# Patient Record
Sex: FEMALE | Race: BLACK OR AFRICAN AMERICAN | NOT HISPANIC OR LATINO | Employment: PART TIME | URBAN - METROPOLITAN AREA
[De-identification: names, ages, dates, MRNs, and addresses within clinical notes are randomized per-mention and may not be internally consistent; named-entity substitution may affect disease eponyms.]

---

## 2018-11-15 ENCOUNTER — OFFICE VISIT (OUTPATIENT)
Dept: FAMILY MEDICINE | Facility: CLINIC | Age: 33
End: 2018-11-15
Payer: COMMERCIAL

## 2018-11-15 VITALS — TEMPERATURE: 98.7 F | DIASTOLIC BLOOD PRESSURE: 75 MMHG | SYSTOLIC BLOOD PRESSURE: 110 MMHG

## 2018-11-15 DIAGNOSIS — Z71.84 TRAVEL ADVICE ENCOUNTER: Primary | ICD-10-CM

## 2018-11-15 PROCEDURE — 99401 PREV MED CNSL INDIV APPRX 15: CPT | Mod: GA | Performed by: FAMILY MEDICINE

## 2018-11-15 RX ORDER — AZITHROMYCIN 500 MG/1
TABLET, FILM COATED ORAL
Qty: 3 TABLET | Refills: 0 | Status: SHIPPED | OUTPATIENT
Start: 2018-11-15 | End: 2024-06-14

## 2018-11-15 RX ORDER — DOXYCYCLINE 100 MG/1
100 CAPSULE ORAL DAILY
Qty: 56 CAPSULE | Refills: 0 | Status: SHIPPED | OUTPATIENT
Start: 2018-11-15 | End: 2024-06-14

## 2018-11-15 NOTE — PROGRESS NOTES
Itinerary:  [facility]     Departure Date: 11/16/18    Return Date: 12/14/18    Length of Trip: 28 days     Purpose of Trip: work     Urban/Rural: urban     Accommodations: house    IMMUNIZATION HISTORY  Have you received any immunizations within the past 4 weeks?  No  Have you ever fainted from having your blood drawn or from an injection?  No  Have you ever had a fever reaction to vaccination?  No  Have you ever had any bad reaction or side effect from any vaccination?  No  Have you ever had hepatitis A or B vaccine?  Yes  Do you live (or work closely) with anyone who has AIDS, an AIDS-like condition, any other immune disorder or who is on chemotherapy for cancer or a   family history of immunodeficiency?  No  Have you received any injection of immune globulin or any blood products during the past 12 months?  No    Patient roomed by JUNIOR Casanova     Special medical concerns: none    /75  Temp 98.7  F (37.1  C) (Oral)  LMP 10/15/2018 (Approximate)  Breastfeeding? No  EXAM: deferred    Immunizations discussed include:   Immunization History   Administered Date(s) Administered     HepB, Unspecified 06/10/1994, 12/20/1994     Hepatitis A Not Indicated - By Hx 12/15/2010     Historical DTP/aP 1986, 01/07/1987, 03/04/1987, 04/06/1988, 08/22/1991     Influenza Vaccine IM 3yrs+ 4 Valent IIV4 11/01/2016, 10/29/2018     MMR 01/06/1988, 06/10/1999     OPV, trivalent, live 1986, 01/07/1987, 04/06/1988, 08/22/1991     Rabies, Unspecified 08/18/2014, 08/25/2014, 09/08/2014     TD (ADULT, 7+) 06/18/2012     Typhoid IM 06/18/2012     Yellow Fever 08/06/2014       Malaraia prophylaxis recommended: pt requests doxycycline  Symptomatic treatment for traveler's diarrhea: azithromycin    ASSESSMENT/PLAN:    ICD-10-CM    1. Travel advice encounter Z71.89 doxycycline (VIBRAMYCIN) 100 MG capsule     azithromycin (ZITHROMAX) 500 MG tablet     I have reviewed general recommendations for safe travel   including:  food/water precautions, insect avoidance, safe sex   practices given high prevalence of HIV and other STDs,   roadway safety. Educational materials and Travax report provided.    Total visit time 15 minutes with over 50% of time spent counseling patient.

## 2018-11-15 NOTE — MR AVS SNAPSHOT
"              After Visit Summary   11/15/2018    Kalen Luis     MRN: [id number]           Patient Information     Date Of Birth          1985        Visit Information        Provider Department      11/15/2018 4:15 PM Tristan Suárez MD [facility]        Today's Diagnoses     Travel advice encounter    -  1       Follow-ups after your visit        Who to contact     If you have questions or need follow up information about today's clinic visit or your schedule please contact [facility] directly at [phone].  Normal or non-critical lab and imaging results will be communicated to you by Callvinehart, letter or phone within 4 business days after the clinic has received the results. If you do not hear from us within 7 days, please contact the clinic through Callvinehart or phone. If you have a critical or abnormal lab result, we will notify you by phone as soon as possible.  Submit refill requests through GaBoom or call your pharmacy and they will forward the refill request to us. Please allow 3 business days for your refill to be completed.          Additional Information About Your Visit        MyChart Information     GaBoom lets you send messages to your doctor, view your test results, renew your prescriptions, schedule appointments and more. To sign up, go to [email or web address] . Click on \"Log in\" on the left side of the screen, which will take you to the Welcome page. Then click on \"Sign up Now\" on the right side of the page.     You will be asked to enter the access code listed below, as well as some personal information. Please follow the directions to create your username and password.     Your access code is: [email or web address]  Expires: 2019 10:40 PM     Your access code will  in 90 days. If you need help or a new code, please call your [facility] or [phone].        Care EveryWhere ID     This is your Care EveryWhere ID. This could be used by other " organizations to access your [place] medical records  [id number]        Your Vitals Were     Temperature Last Period Breastfeeding?             98.7  F (37.1  C) (Oral) 10/15/2018 (Approximate) No          Blood Pressure from Last 3 Encounters:   11/15/18 110/75    Weight from Last 3 Encounters:   No data found for Wt              Today, you had the following     No orders found for display         Today's Medication Changes          These changes are accurate as of 11/15/18 11:59 PM.  If you have any questions, ask your nurse or doctor.               Start taking these medicines.        Dose/Directions    azithromycin 500 MG tablet   Commonly known as:  ZITHROMAX   Used for:  Travel advice encounter        Take one tablet daily for up to 3 days as needed for traveler's diarrhea   Quantity:  3 tablet   Refills:  0       doxycycline 100 MG capsule   Commonly known as:  VIBRAMYCIN   Used for:  Travel advice encounter        Dose:  100 mg   Take 1 capsule (100 mg) by mouth daily For malaria prevention   Quantity:  56 capsule   Refills:  0            Where to get your medicines      Some of these will need a paper prescription and others can be bought over the counter.  Ask your nurse if you have questions.     Bring a paper prescription for each of these medications     azithromycin 500 MG tablet    doxycycline 100 MG capsule                Primary Care Provider Fax #    Physician No Ref-Primary [phone]       No address on file        Equal Access to Services     ROB FREGOSO : Gwendolyn Eldridge, nick inman, sola kaallubna storey, erick garcia. So [facility] [phone].    ATENCIÓN: Si habla español, tiene a ugalde disposición servicios gratuitos de asistencia lingüística. Llame al [phone].    We comply with applicable federal civil rights laws and [place] laws. We do not discriminate on the basis of race, color, national origin, age, disability, sex, sexual  orientation, or gender identity.            Thank you!     Thank you for choosing [facility]  for your care. Our goal is always to provide you with excellent care. Hearing back from our patients is one way we can continue to improve our services. Please take a few minutes to complete the written survey that you may receive in the mail after your visit with us. Thank you!             Your Updated Medication List - Protect others around you: Learn how to safely use, store and throw away your medicines at www.disposemymeds.org.          This list is accurate as of 11/15/18 11:59 PM.  Always use your most recent med list.                   Brand Name Dispense Instructions for use Diagnosis    azithromycin 500 MG tablet    ZITHROMAX    3 tablet    Take one tablet daily for up to 3 days as needed for traveler's diarrhea    Travel advice encounter       doxycycline 100 MG capsule    VIBRAMYCIN    56 capsule    Take 1 capsule (100 mg) by mouth daily For malaria prevention    Travel advice encounter

## 2023-08-09 ENCOUNTER — HOSPITAL ENCOUNTER (EMERGENCY)
Facility: CLINIC | Age: 38
Discharge: HOME | End: 2023-08-09
Attending: EMERGENCY MEDICINE | Admitting: EMERGENCY MEDICINE
Payer: COMMERCIAL

## 2023-08-09 ENCOUNTER — APPOINTMENT (OUTPATIENT)
Dept: CT IMAGING | Facility: CLINIC | Age: 38
End: 2023-08-09
Attending: EMERGENCY MEDICINE
Payer: COMMERCIAL

## 2023-08-09 VITALS
DIASTOLIC BLOOD PRESSURE: 83 MMHG | TEMPERATURE: 98.3 F | OXYGEN SATURATION: 99 % | RESPIRATION RATE: 18 BRPM | SYSTOLIC BLOOD PRESSURE: 126 MMHG | HEART RATE: 81 BPM

## 2023-08-09 DIAGNOSIS — R13.10 DYSPHAGIA, UNSPECIFIED TYPE: ICD-10-CM

## 2023-08-09 LAB
ALBUMIN SERPL BCG-MCNC: 4.3 G/DL (ref 3.5–5.2)
ALP SERPL-CCNC: 42 U/L (ref 35–104)
ALT SERPL W P-5'-P-CCNC: 8 U/L (ref 0–50)
ANION GAP SERPL CALCULATED.3IONS-SCNC: 11 MMOL/L (ref 7–15)
AST SERPL W P-5'-P-CCNC: 29 U/L (ref 0–45)
BASOPHILS # BLD AUTO: 0.1 10E3/UL (ref 0–0.2)
BASOPHILS NFR BLD AUTO: 1 %
BILIRUB SERPL-MCNC: 0.4 MG/DL
BUN SERPL-MCNC: 8.5 MG/DL (ref 6–20)
CALCIUM SERPL-MCNC: 9.5 MG/DL (ref 8.6–10)
CHLORIDE SERPL-SCNC: 105 MMOL/L (ref 98–107)
CREAT SERPL-MCNC: 0.79 MG/DL (ref 0.51–0.95)
DEPRECATED HCO3 PLAS-SCNC: 22 MMOL/L (ref 22–29)
EOSINOPHIL # BLD AUTO: 0 10E3/UL (ref 0–0.7)
EOSINOPHIL NFR BLD AUTO: 0 %
ERYTHROCYTE [DISTWIDTH] IN BLOOD BY AUTOMATED COUNT: 15.4 % (ref 10–15)
GFR SERPL CREATININE-BSD FRML MDRD: >90 ML/MIN/1.73M2
GLUCOSE SERPL-MCNC: 82 MG/DL (ref 70–99)
HCG UR QL: NEGATIVE
HCT VFR BLD AUTO: 38.4 % (ref 35–47)
HGB BLD-MCNC: 12 G/DL (ref 11.7–15.7)
IMM GRANULOCYTES # BLD: 0 10E3/UL
IMM GRANULOCYTES NFR BLD: 0 %
INTERNAL QC OK POCT: NORMAL
LYMPHOCYTES # BLD AUTO: 2.2 10E3/UL (ref 0.8–5.3)
LYMPHOCYTES NFR BLD AUTO: 32 %
MCH RBC QN AUTO: 25.9 PG (ref 26.5–33)
MCHC RBC AUTO-ENTMCNC: 31.3 G/DL (ref 31.5–36.5)
MCV RBC AUTO: 83 FL (ref 78–100)
MONOCYTES # BLD AUTO: 0.4 10E3/UL (ref 0–1.3)
MONOCYTES NFR BLD AUTO: 6 %
NEUTROPHILS # BLD AUTO: 4.2 10E3/UL (ref 1.6–8.3)
NEUTROPHILS NFR BLD AUTO: 61 %
NRBC # BLD AUTO: 0 10E3/UL
NRBC BLD AUTO-RTO: 0 /100
PLATELET # BLD AUTO: 333 10E3/UL (ref 150–450)
POCT KIT EXPIRATION DATE: NORMAL
POCT KIT LOT NUMBER: NORMAL
POTASSIUM SERPL-SCNC: 3.9 MMOL/L (ref 3.4–5.3)
PROT SERPL-MCNC: 7.6 G/DL (ref 6.4–8.3)
RBC # BLD AUTO: 4.64 10E6/UL (ref 3.8–5.2)
SODIUM SERPL-SCNC: 138 MMOL/L (ref 136–145)
WBC # BLD AUTO: 6.9 10E3/UL (ref 4–11)

## 2023-08-09 PROCEDURE — 99285 EMERGENCY DEPT VISIT HI MDM: CPT | Mod: 25 | Performed by: EMERGENCY MEDICINE

## 2023-08-09 PROCEDURE — 70491 CT SOFT TISSUE NECK W/DYE: CPT

## 2023-08-09 PROCEDURE — 250N000011 HC RX IP 250 OP 636: Performed by: EMERGENCY MEDICINE

## 2023-08-09 PROCEDURE — 70491 CT SOFT TISSUE NECK W/DYE: CPT | Mod: 26 | Performed by: RADIOLOGY

## 2023-08-09 PROCEDURE — 81025 URINE PREGNANCY TEST: CPT | Performed by: EMERGENCY MEDICINE

## 2023-08-09 PROCEDURE — 99285 EMERGENCY DEPT VISIT HI MDM: CPT | Performed by: EMERGENCY MEDICINE

## 2023-08-09 PROCEDURE — 85014 HEMATOCRIT: CPT | Performed by: EMERGENCY MEDICINE

## 2023-08-09 PROCEDURE — 36415 COLL VENOUS BLD VENIPUNCTURE: CPT | Performed by: EMERGENCY MEDICINE

## 2023-08-09 PROCEDURE — 82310 ASSAY OF CALCIUM: CPT | Performed by: EMERGENCY MEDICINE

## 2023-08-09 RX ORDER — IOPAMIDOL 755 MG/ML
100 INJECTION, SOLUTION INTRAVASCULAR ONCE
Status: COMPLETED | OUTPATIENT
Start: 2023-08-09 | End: 2023-08-09

## 2023-08-09 RX ADMIN — IOPAMIDOL 100 ML: 755 INJECTION, SOLUTION INTRAVENOUS at 21:43

## 2023-08-09 ASSESSMENT — ACTIVITIES OF DAILY LIVING (ADL): ADLS_ACUITY_SCORE: 35

## 2023-08-10 NOTE — DISCHARGE INSTRUCTIONS
Follow-up with ENT in office  Please return to emergency department for fever>104F, persistent vomiting, difficulty swallowing/speaking/breathing.   For pain, please take 975-1000mg acetaminophen (tylenol) every 8 hours -- do not take more than 3000mg in a 24 hour period.    You can also take 600mg ibuprofen (motrin/advil) every 6 hours for pain

## 2023-08-10 NOTE — ED PROVIDER NOTES
[place] EMERGENCY DEPARTMENT ([facility])    8/09/23       ED PROVIDER NOTE    History     Chief Complaint   Patient presents with    Oral Swelling     HPI  Kalen Luis is a 37 year old female who reports 7-10 days of insidious onset sensation in her throat/neck. Patient reports that she has felt difficulty swallowing solids and liquids. She reports the feeling of a mass that pushes on her airway when she flexes or extends her neck. No difficulty breathing. Denies headache, chest pain, fever, or other URI symptoms.    This part of the medical record was transcribed by Ron Fu, from a dictation done by Papo Barahona MD.     Past Medical History  History reviewed. No pertinent past medical history.  History reviewed. No pertinent surgical history.  azithromycin (ZITHROMAX) 500 MG tablet  doxycycline (VIBRAMYCIN) 100 MG capsule      No Known Allergies  Family History  History reviewed. No pertinent family history.  Social History       Past medical history, past surgical history, medications, allergies, family history, and social history were reviewed with the patient. No additional pertinent items.      A medically appropriate review of systems was performed with pertinent positives and negatives noted in the HPI, and all other systems negative.    Physical Exam   BP: (!) 144/86  Pulse: 89  Temp: 98.6  F (37  C)  Resp: 18  SpO2: 100 %    Physical Exam  Neck supple. Atraumatic head. Uvula midline. Tonsils showed minimal white exudate on right tonsil, though not enlarged. Tongue normal. Sensation in her anterior neck occurs when she flexes or extends her neck. Clear voice, no stridor.     This part of the medical record was transcribed by Ron Fu, from a dictation done by Papo Barahona MD.     ED Course, Procedures, & Data     ED Course as of 08/09/23 9296   Wed Aug 09, 2023   2233 CT neck negative for acute findings.  Discussed w/ patient  and family at bedside.  Will discharge w/ ENT followup      Procedures                 No results found for any visits on 08/09/23.  Medications - No data to display  Labs Ordered and Resulted from Time of ED Arrival to Time of ED Departure - No data to display  No orders to display          Critical care was not performed.     Medical Decision Making  The patient's presentation was of high complexity (an acute health issue posing potential threat to life or bodily function).    The patient's evaluation involved:  an assessment requiring an independent historian (father at bedside)  ordering and/or review of 3+ test(s) in this encounter (see separate area of note for details)  independent interpretation of testing performed by another health professional (CT neck)    The patient's management necessitated high risk (a decision regarding hospitalization).    Assessment & Plan      Somewhat concerning mass effect. No airway compromise. Potential esophageal or mass effect in anterior neck. Will rule out an impending mass or less likely a possible peritonsillar abscess with a CT with contrast of her neck soft tissue. Will perform labs, screen for pregnancy, and will reassess afterwards and will consider hospital admission.   Pain medications declined.   If CT negative will refer to ENT as outpatient.    Prior to discharge, I discussed the case with patient as well as her father at bedside.  No signs of acute mass effect.  I reviewed the CT neck imaging personally and do not see any acute airway compromise    This part of the medical record was transcribed by Franci Armando, Medical Scribe, from a dictation done by Papo Barahona MD.     I have reviewed the nursing notes. I have reviewed the findings, diagnosis, plan and need for follow up with the patient.    New Prescriptions    No medications on file       Final diagnoses:   None     Papo Barahona MD   [facility] EMERGENCY DEPARTMENT  8/9/2023      Papo Barahona MD  08/10/23 0102

## 2023-08-10 NOTE — ED TRIAGE NOTES
Pt started to noticed a sensation in her throat described as a pressure/irritation  Pt has seasonal allergies   Pt denies N&V, and shortness of breath and dizziness

## 2024-06-14 ENCOUNTER — HOSPITAL ENCOUNTER (EMERGENCY)
Facility: CLINIC | Age: 39
Discharge: HOME | End: 2024-06-14
Attending: EMERGENCY MEDICINE | Admitting: EMERGENCY MEDICINE
Payer: COMMERCIAL

## 2024-06-14 ENCOUNTER — HOSPITAL ENCOUNTER (OUTPATIENT)
Facility: CLINIC | Age: 39
Discharge: HOME | End: 2024-06-15
Attending: OBSTETRICS & GYNECOLOGY | Admitting: OBSTETRICS & GYNECOLOGY
Payer: COMMERCIAL

## 2024-06-14 VITALS
DIASTOLIC BLOOD PRESSURE: 75 MMHG | HEIGHT: 66 IN | BODY MASS INDEX: 24.72 KG/M2 | RESPIRATION RATE: 18 BRPM | OXYGEN SATURATION: 99 % | TEMPERATURE: 97.4 F | SYSTOLIC BLOOD PRESSURE: 128 MMHG | HEART RATE: 79 BPM | WEIGHT: 153.8 LBS

## 2024-06-14 DIAGNOSIS — B96.89 BACTERIAL VAGINOSIS IN PREGNANCY: Primary | ICD-10-CM

## 2024-06-14 DIAGNOSIS — O23.599 BACTERIAL VAGINOSIS IN PREGNANCY: Primary | ICD-10-CM

## 2024-06-14 DIAGNOSIS — N89.8 VAGINAL DISCHARGE: ICD-10-CM

## 2024-06-14 PROCEDURE — 76815 OB US LIMITED FETUS(S): CPT | Performed by: EMERGENCY MEDICINE

## 2024-06-14 PROCEDURE — G0463 HOSPITAL OUTPT CLINIC VISIT: HCPCS

## 2024-06-14 PROCEDURE — 99284 EMERGENCY DEPT VISIT MOD MDM: CPT | Mod: 25 | Performed by: EMERGENCY MEDICINE

## 2024-06-14 PROCEDURE — 76815 OB US LIMITED FETUS(S): CPT | Mod: 26 | Performed by: EMERGENCY MEDICINE

## 2024-06-14 ASSESSMENT — ACTIVITIES OF DAILY LIVING (ADL): ADLS_ACUITY_SCORE: 33

## 2024-06-14 ASSESSMENT — COLUMBIA-SUICIDE SEVERITY RATING SCALE - C-SSRS
1. IN THE PAST MONTH, HAVE YOU WISHED YOU WERE DEAD OR WISHED YOU COULD GO TO SLEEP AND NOT WAKE UP?: NO
6. HAVE YOU EVER DONE ANYTHING, STARTED TO DO ANYTHING, OR PREPARED TO DO ANYTHING TO END YOUR LIFE?: NO
2. HAVE YOU ACTUALLY HAD ANY THOUGHTS OF KILLING YOURSELF IN THE PAST MONTH?: NO

## 2024-06-15 VITALS — RESPIRATION RATE: 16 BRPM | SYSTOLIC BLOOD PRESSURE: 124 MMHG | TEMPERATURE: 98.1 F | DIASTOLIC BLOOD PRESSURE: 66 MMHG

## 2024-06-15 LAB
C TRACH DNA SPEC QL PROBE+SIG AMP: NEGATIVE
CLUE CELLS: PRESENT
N GONORRHOEA DNA SPEC QL NAA+PROBE: NEGATIVE
TRICHOMONAS, WET PREP: ABNORMAL
WBC'S/HIGH POWER FIELD, WET PREP: ABNORMAL
YEAST, WET PREP: ABNORMAL

## 2024-06-15 PROCEDURE — 87210 SMEAR WET MOUNT SALINE/INK: CPT

## 2024-06-15 PROCEDURE — 87491 CHLMYD TRACH DNA AMP PROBE: CPT

## 2024-06-15 RX ORDER — ASPIRIN 81 MG/1
81 TABLET ORAL DAILY
COMMUNITY

## 2024-06-15 RX ORDER — METRONIDAZOLE 500 MG/1
500 TABLET ORAL 2 TIMES DAILY
Qty: 14 TABLET | Refills: 0 | Status: SHIPPED | OUTPATIENT
Start: 2024-06-15 | End: 2024-06-22

## 2024-06-15 ASSESSMENT — ACTIVITIES OF DAILY LIVING (ADL)
ADLS_ACUITY_SCORE: 20
ADLS_ACUITY_SCORE: 20

## 2024-06-15 NOTE — ED PROVIDER NOTES
"ED Provider Note  [facility]      History     Chief Complaint   Patient presents with    Vaginal Discharge     HPI  Kalen Luis is a 38 year old female who presents to the ER for evaluation of vaginal discharge. She is 19 weeks and 2 days pregnant. She states her pregnancy has been overall uncomplicated. This afternoon she had an episode of clear, watery, thin discharge. She is not able to quantify how much but states she could feel it. This then decreased and is not present at this time. She had called her OB phone line and was told she should get evaluated. She denies bleeding or abdominal pain.    Past Medical History  No past medical history on file.  No past surgical history on file.  No current outpatient medications on file.    No Known Allergies  Family History  No family history on file.  Social History       A medically appropriate review of systems was performed with pertinent positives and negatives noted in the HPI, and all other systems negative.    Physical Exam   BP: 128/75  Pulse: 79  Temp: 97.4  F (36.3  C)  Resp: 18  Height: 167.6 cm (5' 6\")  Weight: 69.8 kg (153 lb 12.8 oz)  SpO2: 99 %  Physical Exam  Constitutional:       General: She is not in acute distress.     Appearance: She is not toxic-appearing.   HENT:      Head: Normocephalic and atraumatic.      Nose: Nose normal.      Mouth/Throat:      Mouth: Mucous membranes are moist.      Pharynx: Oropharynx is clear.   Eyes:      Extraocular Movements: Extraocular movements intact.      Pupils: Pupils are equal, round, and reactive to light.   Cardiovascular:      Rate and Rhythm: Normal rate and regular rhythm.      Heart sounds: No murmur heard.     No gallop.   Pulmonary:      Effort: Pulmonary effort is normal. No respiratory distress.      Breath sounds: No wheezing or rales.   Abdominal:      General: There is no distension.      Palpations: Abdomen is soft.      Tenderness: There is no abdominal tenderness. " There is no guarding.   Musculoskeletal:         General: Normal range of motion.      Cervical back: Normal range of motion.   Skin:     General: Skin is warm and dry.   Neurological:      General: No focal deficit present.      Mental Status: She is alert and oriented to person, place, and time.           ED Course, Procedures, & Data      Procedures  Results for orders placed during the hospital encounter of 06/14/24    POC US OB TRANSABDOMINAL LIMITED    Narrative  Limited Bedside Transabdominal ultrasound for evaluation of IUP  Performed any interpreted by me.    Indication:  vaginal discharge  Findings:  The lower abdomen was interrogated with a curvilinear probe. The uterus was identified.  Within the uterus there is IUP with active fetal movement and a FHR in the 130s.    Impression: Live IUP    Impression  Impression: Live IUP                Results for orders placed or performed during the hospital encounter of 06/14/24   POC US OB TRANSABDOMINAL LIMITED     Status: None    Narrative    Limited Bedside Transabdominal ultrasound for evaluation of IUP        Performed any interpreted by me.    Indication:  vaginal discharge  Findings:  The lower abdomen was interrogated with a curvilinear probe. The uterus was identified.   Within the uterus there is IUP with active fetal movement and a FHR in the 130s.    Impression: Live IUP       Impression    Impression: Live IUP     Medications - No data to display  Labs Ordered and Resulted from Time of ED Arrival to Time of ED Departure - No data to display  POC US OB TRANSABDOMINAL LIMITED   Final Result   Impression: Live IUP             Critical care was not performed.     Medical Decision Making  The patient's presentation was of moderate complexity (an undiagnosed new problem with uncertain prognosis).    The patient's evaluation involved:  review of external note(s) from 2 sources (outside OB notes, telephone encounter)  review of 1 test result(s) ordered prior  to this encounter (outside OB US)  discussion of management or test interpretation with another health professional (see separate area of note for details)    The patient's management necessitated only low risk treatment.    Assessment & Plan    This is a 38-year-old female currently 19 weeks and 2 days pregnant here with vaginal discharge.  Overall she is well-appearing and vital reassuring.  Abdomen soft and nontender.  I did a bedside ultrasound which confirmed a live IUP.  The reported clear, thin, watery discharge is concern for possible premature rupture of membranes.  We are unable to do the ferning test as we do not have the swabs and lab does not have the capability.  I spoke with OB staff recommended we send the patient to L&D triage for evaluation.  Discussed this with the patient and she was agreeable with this plan.  Return precautions were discussed and all questions answered.  I have reviewed the nursing notes. I have reviewed the findings, diagnosis, plan and need for follow up with the patient.    New Prescriptions    No medications on file       Final diagnoses:   Vaginal discharge         [facility] EMERGENCY DEPARTMENT  6/14/2024     Salbador Harris MD  06/14/24 3832

## 2024-06-15 NOTE — PROGRESS NOTES
Obstetrics Triage Note    HPI:  Kalen Luis is a 38 year old  at 19w1d  by LMP here with complaints of leakage of fluid.    Patient reports noticing wetness in her underwear this morning.  She says this has happened in the past but is overanxious today.  She denies any gushes of fluid.  Denies vaginal bleeding.  This has not been baby move but is only 9 weeks pregnant.  Became very anxious and so decided to come into the emergency department.  She shows up to the [facility] was transferred to the [facility] for evaluation.    Otherwise feeling very well and has no concerns.    ROS:  Negative except as mentioned in HPI.    PMH:  History reviewed. No pertinent past medical history.    PSHx:  History reviewed. No pertinent surgical history.    Medications:  No current facility-administered medications for this encounter.     Current Outpatient Medications   Medication Sig Dispense Refill    aspirin 81 MG EC tablet Take 81 mg by mouth daily      metroNIDAZOLE (FLAGYL) 500 MG tablet Take 1 tablet (500 mg) by mouth 2 times daily for 7 days 14 tablet 0    Prenatal Vit-Fe Fumarate-FA (PRENATAL VITAMIN AND MINERAL PO)          Allergies:   No Known Allergies    Physical Exam:   Vitals:    06/15/24 0007   BP: 124/66   BP Location: Left arm   Patient Position: Semi-Lewis's   Cuff Size: Adult Regular   Resp: 16   Temp: 98.1  F (36.7  C)   TempSrc: Oral      Gen: resting comfortably, in NAD  CV: Regular rate and rhythm,   Pulm: CTAB, no increased work of breathing  Abd: soft, gravid, non-tender, non-distended    SSE: Normal vaginal mucosa with a lot of discharge, close cervix, no pooling of fluid,    Doppler 135 bpm    Labs:    Recent Results (from the past 12 hour(s))   Wet preparation    Collection Time: 06/15/24  1:18 AM    Specimen: Vagina; Swab   Result Value Ref Range    Trichomonas Absent Absent    Yeast Absent Absent    Clue Cells Present (A) Absent    WBCs/high power field 2+ (A) None        Assessment/Plan:  Kalen Luis is a 38 year old  at 19w1d with uncomplicated pregnancy presenting for rule out Previable PPROM    #Increased vaginal discharge  #BV  Physical exam notable for increased physiologic discharge.  No concerns for cosignature membranes, no pooling.  Wet prep notable for positive for clue cells.  In the setting of symptomatic increased discharge and positive clue cells diagnosis of bacterial vaginosis to be made.  Not concerned about previable rupture of membranes.  -Sent Flagyl 500 mg twice daily x 7 days to preferred pharmacy  -Patient follow-up with primary OB/GYN and partners    #FWB  Doppler 150    - Dispo: to home with follow up in the next week. Discussed tylenol for pain as needed. Discussed labor warning signs and indication to return to care.    German Adhikari DO [facility] PGY3  5:47 AM Nan 15, 2024     [facility] staff:  Appreciate note by Dr. Adhikari. I have reviewed, edited, and agree with the note.      Yoly Ellis MD, FACOG

## 2024-06-15 NOTE — PROVIDER NOTIFICATION
06/15/24 0013   Provider Notification   Provider Name/Title Dr. Adhikari   Method of Notification Electronic Page   Request Evaluate in Person   Notification Reason Patient Arrived       Patient arrived, reporting that she noticed increased watery vaginal discharge around 1800 tonight. Denies vaginal bleeding, contractions, or abdominal pain. Feeling occasional FM consistent with what she normally does.

## 2024-06-15 NOTE — PLAN OF CARE
Data: Patient assessed in the [facility] for leaking vaginal fluid. Cervical exam deferred. Cervix appeared closed with speculum exam. Membranes intact. Contractions are not present. See flowsheets for intermittent auscultation fetal assessment documentation. Labs sent.     Action: Presumed adequate fetal oxygenation documented. Discharge instructions reviewed. Patient instructed to report change in fetal movement, vaginal leaking of fluid or bleeding, abdominal pain, or any concerns related to the pregnancy to provider/clinic.      Response: Orders to discharge home per Dr. German Adhikari. Patient verbalized understanding of education and agreement with plan. Discharged to home at 0145.

## 2024-06-15 NOTE — ED TRIAGE NOTES
Pt ambulatory to triage with c/o vaginal discharge, clear watery. Pt is currently 19 weeks 3 days. Denies pain or discomfort at this time     Triage Assessment (Adult)       Row Name 06/14/24 7198          Triage Assessment    Airway WDL WDL        Respiratory WDL    Respiratory WDL WDL        Skin Circulation/Temperature WDL    Skin Circulation/Temperature WDL WDL        Cardiac WDL    Cardiac WDL WDL        Peripheral/Neurovascular WDL    Peripheral Neurovascular WDL WDL        Cognitive/Neuro/Behavioral WDL    Cognitive/Neuro/Behavioral WDL WDL

## 2024-06-15 NOTE — DISCHARGE INSTRUCTIONS
Please go directly to labor and delivery triage [facility]. I have talked to our OB doctors and they are expecting you. They will be able to do the test we discussed to rule out  rupture.     The [facility] is at [facility] [place], [address]. The L&D triage is in the [facility] building on the 4th floor.

## 2024-07-14 ENCOUNTER — HEALTH MAINTENANCE LETTER (OUTPATIENT)
Age: 39
End: 2024-07-14

## 2024-08-27 ENCOUNTER — HOSPITAL ENCOUNTER (OUTPATIENT)
Facility: CLINIC | Age: 39
Discharge: HOME | End: 2024-08-27
Attending: OBSTETRICS & GYNECOLOGY | Admitting: OBSTETRICS & GYNECOLOGY
Payer: COMMERCIAL

## 2024-08-27 ENCOUNTER — HOSPITAL ENCOUNTER (OUTPATIENT)
Facility: CLINIC | Age: 39
End: 2024-08-27
Admitting: OBSTETRICS & GYNECOLOGY
Payer: COMMERCIAL

## 2024-08-27 VITALS — SYSTOLIC BLOOD PRESSURE: 109 MMHG | RESPIRATION RATE: 16 BRPM | DIASTOLIC BLOOD PRESSURE: 70 MMHG | TEMPERATURE: 97.9 F

## 2024-08-27 DIAGNOSIS — Z36.89 ENCOUNTER FOR TRIAGE IN PREGNANT PATIENT: Primary | ICD-10-CM

## 2024-08-27 LAB
ALBUMIN UR-MCNC: NEGATIVE MG/DL
APPEARANCE UR: CLEAR
BACTERIA #/AREA URNS HPF: ABNORMAL /HPF
BILIRUB UR QL STRIP: NEGATIVE
CLUE CELLS: PRESENT
COLOR UR AUTO: ABNORMAL
CRYSTALS AMN MICRO: NORMAL
GLUCOSE UR STRIP-MCNC: NEGATIVE MG/DL
HGB UR QL STRIP: ABNORMAL
KETONES UR STRIP-MCNC: NEGATIVE MG/DL
LEUKOCYTE ESTERASE UR QL STRIP: NEGATIVE
NITRATE UR QL: NEGATIVE
PH UR STRIP: 6 [PH] (ref 5–7)
RBC URINE: 2 /HPF
RUPTURE OF FETAL MEMBRANES BY ROM PLUS: NEGATIVE
SP GR UR STRIP: 1 (ref 1–1.03)
SQUAMOUS EPITHELIAL: <1 /HPF
TRICHOMONAS, WET PREP: ABNORMAL
UROBILINOGEN UR STRIP-MCNC: NORMAL MG/DL
WBC URINE: 2 /HPF
WBC'S/HIGH POWER FIELD, WET PREP: ABNORMAL
YEAST, WET PREP: ABNORMAL

## 2024-08-27 PROCEDURE — 250N000013 HC RX MED GY IP 250 OP 250 PS 637

## 2024-08-27 PROCEDURE — 84112 EVAL AMNIOTIC FLUID PROTEIN: CPT

## 2024-08-27 PROCEDURE — G0463 HOSPITAL OUTPT CLINIC VISIT: HCPCS

## 2024-08-27 PROCEDURE — 87491 CHLMYD TRACH DNA AMP PROBE: CPT

## 2024-08-27 PROCEDURE — 81001 URINALYSIS AUTO W/SCOPE: CPT

## 2024-08-27 PROCEDURE — 87210 SMEAR WET MOUNT SALINE/INK: CPT

## 2024-08-27 RX ORDER — METRONIDAZOLE 500 MG/1
500 TABLET ORAL 2 TIMES DAILY
Qty: 13 TABLET | Refills: 0 | Status: ON HOLD | OUTPATIENT
Start: 2024-08-27 | End: 2024-09-17

## 2024-08-27 RX ORDER — LIDOCAINE 40 MG/G
CREAM TOPICAL
Status: DISCONTINUED | OUTPATIENT
Start: 2024-08-27 | End: 2024-08-27 | Stop reason: HOSPADM

## 2024-08-27 RX ORDER — METRONIDAZOLE 500 MG/1
500 TABLET ORAL 2 TIMES DAILY
Qty: 14 TABLET | Refills: 0 | Status: SHIPPED | OUTPATIENT
Start: 2024-08-27 | End: 2024-08-27

## 2024-08-27 RX ORDER — METRONIDAZOLE 500 MG/1
500 TABLET ORAL 2 TIMES DAILY
Status: DISCONTINUED | OUTPATIENT
Start: 2024-08-27 | End: 2024-08-27 | Stop reason: HOSPADM

## 2024-08-27 RX ADMIN — METRONIDAZOLE 500 MG: 500 TABLET ORAL at 21:25

## 2024-08-27 ASSESSMENT — ACTIVITIES OF DAILY LIVING (ADL)
ADLS_ACUITY_SCORE: 20
ADLS_ACUITY_SCORE: 20
ADLS_ACUITY_SCORE: 33

## 2024-08-28 LAB
C TRACH DNA SPEC QL PROBE+SIG AMP: NEGATIVE
N GONORRHOEA DNA SPEC QL NAA+PROBE: NEGATIVE

## 2024-08-28 NOTE — PROVIDER NOTIFICATION
08/27/24 1925   Provider Notification   Provider Name/Title Dr. Gray   Method of Notification Electronic Page   Request Evaluate in Person   Notification Reason Patient Arrived     Patient arrives, reporting that she noticed wetness in her underwear around 1815 tonight. Denies vaginal bleeding, contractions, or abdominal pain. Feeling good active fetal movement.

## 2024-08-28 NOTE — PLAN OF CARE
Data: Patient presented to [facility]: 2024  7:01 PM.  Reason for maternal/fetal assessment is leaking vaginal fluid. Patient reports wetness in her underwear around 1815 tonight. Patient denies uterine contractions, vaginal bleeding, abdominal pain, pelvic pressure. Patient reports fetal movement is normal. Patient is a 29w6d . Prenatal record reviewed. Pregnancy has been complicated by gestational diabetes, advanced maternal age (>=34yo), and uterine fibroids.    Vital signs wnl. Support person is present.     Action: Verbal consent for EFM. Triage assessment completed.     Response: Patient verbalized agreement with plan. Will contact Dr. Gray PGY-2 with update and further orders.

## 2024-08-28 NOTE — DISCHARGE INSTRUCTIONS
Learning About When to Call Your Doctor During Pregnancy (After 20 Weeks)  Overview  It's common to have concerns about what might be a problem when you're pregnant. Most pregnancies don't have any serious problems. But it's still important to know when to call your doctor if you have certain symptoms or signs of labor.  These are general suggestions. Your doctor may give you some more information about when to call.  When to call your doctor (after 20 weeks)  Call 911  anytime you think you may need emergency care. For example, call if:  You have severe vaginal bleeding. This means you are soaking through a pad each hour for 2 or more hours.  You have sudden, severe pain in your belly.  You have chest pain, are short of breath, or cough up blood.  You passed out (lost consciousness).  You have a seizure.  You see or feel the umbilical cord.  You think you are about to deliver your baby and can't make it safely to the hospital or birthing center.  Call your doctor now or seek immediate medical care if:  You have vaginal bleeding.  You have belly pain.  You have a fever.  You are dizzy or lightheaded, or you feel like you may faint.  You have signs of a blood clot in your leg (called a deep vein thrombosis), such as:  Pain in the calf, back of the knee, thigh, or groin.  Swelling in your leg or groin.  A color change on the leg or groin. The skin may be reddish or purplish, depending on your usual skin color.  You have symptoms of preeclampsia, such as:  Sudden swelling of your face, hands, or feet.  New vision problems (such as dimness, blurring, or seeing spots).  A severe headache.  You have a sudden release of fluid from your vagina. (You think your water broke.)  You've been having regular contractions for an hour. This means that you've had at least 6 contractions within 1 hour, even after you change your position and drink fluids.  You notice that your baby has stopped moving or is moving less than  "normal.  You have signs of heart failure, such as:  New or increased shortness of breath.  New or worse swelling in your legs, ankles, or feet.  Sudden weight gain, such as more than 2 to 3 pounds in a day or 5 pounds in a week.  Feeling so tired or weak that you cannot do your usual activities.  You have symptoms of a urinary tract infection. These may include:  Pain or burning when you urinate.  A frequent need to urinate without being able to pass much urine.  Pain in the flank, which is just below the rib cage and above the waist on either side of the back.  Blood in your urine.  Watch closely for changes in your health, and be sure to contact your doctor if:  You have vaginal discharge that smells bad.  You feel sad, anxious, or hopeless for more than a few days.  You have skin changes, such as a rash, itching, or a yellow color to your skin.  You have other concerns about your pregnancy.  If you have labor signs at 37 weeks or more  If you have signs of labor at 37 weeks or more, your doctor may tell you to call when your labor becomes more active. Symptoms of active labor include:  Contractions that are regular.  Contractions that are less than 5 minutes apart.  Contractions that are hard to talk through.  Follow-up care is a key part of your treatment and safety. Be sure to make and go to all appointments, and call your doctor if you are having problems. It's also a good idea to know your test results and keep a list of the medicines you take.  Where can you learn more?  Go to https://www.[employer].net/patiented  Enter N531 in the search box to learn more about \"Learning About When to Call Your Doctor During Pregnancy (After 20 Weeks).\"  Current as of: July 10, 2023  Content Version: 14.1    5867-1039 [employer].   Care instructions adapted under license by your healthcare professional. If you have questions about a medical condition or this instruction, always ask your healthcare " professional. [employer] disclaims any warranty or liability for your use of this information.

## 2024-08-28 NOTE — PLAN OF CARE
Data: Patient assessed in the [facility]place for leaking vaginal fluid. Cervical exam deferred. Membranes intact. Contractions are present x1, patient unable to feel contractions. See flowsheets for fetal assessment documentation.     Action: Presumed adequate fetal oxygenation documented. Discharge instructions reviewed. Patient instructed to report change in fetal movement, vaginal leaking of fluid or bleeding, abdominal pain, or any concerns related to the pregnancy to provider/clinic.      Response: Orders to discharge home per Dr. Gray. Patient verbalized understanding of education and agreement with plan. Discharged to home at 2141.

## 2024-08-28 NOTE — PROGRESS NOTES
"L&D Triage Progress Note     HPI: Kalen Luis is a 38 year old  at 29w6d by LMP here for r/o PPROM. Had some wetness in her underwear this evening at 1815. Unsure of color, said it was just \"on her underwear so I couldn't tell\". No leaking since then. No new vaginal discharge or itching. Reports regular fetal movement. No contractions or vaginal bleeding. Otherwise feeling well. Typically receives her care through the [facility].     Pregnancy notable for:  - GDMA1  - Uterine fibroids   - AMA    Lab Results   Component Value Date    HGB 12.0 2023         OBHX:  OB History    Para Term  AB Living   1 0 0 0 0 0   SAB IAB Ectopic Multiple Live Births   0 0 0 0 0      # Outcome Date GA Lbr Livan/2nd Weight Sex Type Anes PTL Lv   1 Current                MedicalHX:  History reviewed. No pertinent past medical history.    SurgicalHX:  History reviewed. No pertinent surgical history.    Medications:  No current facility-administered medications for this encounter.       Allergies:  No Known Allergies    FamilyHX:    History reviewed. No pertinent family history.    ROS: Negative except as stated above.     Physical Exam:  Vitals:    24 1914   BP: 109/70   BP Location: Left arm   Patient Position: Semi-Lewis's   Cuff Size: Adult Regular   Resp: 16   Temp: 97.9  F (36.6  C)   TempSrc: Oral     GEN: resting comfortably in bed, NAD   CV: Regular rate, well perfused  PULM: On room air, no increased work of breathing  SSE: Normal appearing external genitalia. Cervix completely visualized and appears closed. No pooling, no extravasation of fluid with Valsalva. Small amount of white appearing discharge.     NST:  FHT: baseline 145, moderate variability, + accels, no decels  TOCO: irregular, 0-1 in 10     A/P: 38 year old  at 29w6d here for r/o PPROM. Pregnancy notable for GDMA1, uterine fibroids, AMA.     # R/o PPROM  - Exam as above, no clinical signs of rupture  - Ferning, " ROM+ negative; UA bland without LE   - WP positive for clue cells, will discharge with Flagyl   - GC/CT pending, will contact via [employer] if positive   - Return precautions reviewed   - Next prenatal visit with [facility] in 2 weeks     # Fetal Well-Being  - NST reactive and reassuring in triage    Dispo: discharge home     Patient discussed with Dr. Sno.     Yajaira Gray DO, PGY-2  [facility]   August 27, 2024 7:29 PM      Staffed patient over the phone, agree with above.  Kimmy Son MD

## 2024-09-17 ENCOUNTER — HOSPITAL ENCOUNTER (OUTPATIENT)
Facility: CLINIC | Age: 39
Discharge: HOME | End: 2024-09-17
Attending: OBSTETRICS & GYNECOLOGY | Admitting: OBSTETRICS & GYNECOLOGY
Payer: COMMERCIAL

## 2024-09-17 VITALS
HEIGHT: 67 IN | WEIGHT: 169 LBS | BODY MASS INDEX: 26.53 KG/M2 | TEMPERATURE: 97.9 F | SYSTOLIC BLOOD PRESSURE: 110 MMHG | DIASTOLIC BLOOD PRESSURE: 65 MMHG

## 2024-09-17 PROCEDURE — 59025 FETAL NON-STRESS TEST: CPT

## 2024-09-17 PROCEDURE — G0463 HOSPITAL OUTPT CLINIC VISIT: HCPCS | Mod: 25

## 2024-09-17 RX ORDER — LIDOCAINE 40 MG/G
CREAM TOPICAL
Status: DISCONTINUED | OUTPATIENT
Start: 2024-09-17 | End: 2024-09-18 | Stop reason: HOSPADM

## 2024-09-17 ASSESSMENT — ACTIVITIES OF DAILY LIVING (ADL)
ADLS_ACUITY_SCORE: 20
ADLS_ACUITY_SCORE: 22

## 2024-09-18 NOTE — PROGRESS NOTES
Data: Patient assessed in the [facility] for abdominal pain. Cervical exam deferred. Membranes intact. Contractions are present. Contactions are  , 4-9 minutes apart, and last  seconds. Uterine assessment is   during contractions and soft by palpation at rest. See flowsheets for fetal assessment documentation.     Action: Presumed adequate fetal oxygenation documented. Discharge instructions reviewed. Patient instructed to report change in fetal movement, vaginal leaking of fluid or bleeding, abdominal pain, or any concerns related to the pregnancy to provider/clinic.      Response: Orders to discharge home per Dr. Forrest. Patient verbalized understanding of education and agreement with plan. Discharged to home at 2326.

## 2024-09-18 NOTE — PROGRESS NOTES
"L&D Triage Progress Note ***    HPI: Kalen Luis is a 39 year old  at 32w6d by ***, here for right upper quadrant pain. Has had aching pain that started at 8:30pm on 24 and has improved since then. She has not taken anything for pain control. Does not have any associated nausea, vomiting, or pain elsewhere in her abdomen. She also denies any pelvic pain or pressure and would prefer to not have a cervical exam today. She receives prenatal care at [facility] where she was noted to have uterine fibroids. She has had previous MSK pain in a similar area of her abdomen.     Pregnancy notable for:  - GDMA2 as of 24 (previously diet controlled)   - advanced maternal age  - uterine fibroids, largest is located retroplacentally     She states that she is feeling well today otherwise.  + FM, no ctx, VB, or LOF.  She denies fever, chills, HA, scotoma, CP, SOB, nausea, vomiting, constipation, diarrhea, dysuria, and acute swelling.    Lab Results   Component Value Date    HGB 12.0 2023       GBS Status:   No results found for: \"GBS\"          OBHX:  OB History    Para Term  AB Living   2 0 0 0 1 0   SAB IAB Ectopic Multiple Live Births   1 0 0 0 0      # Outcome Date GA Lbr Livan/2nd Weight Sex Type Anes PTL Lv   2 Current            1 SAB 10/18/23               MedicalHX:  History reviewed. No pertinent past medical history.    SurgicalHX:  History reviewed. No pertinent surgical history.    Medications:  No current facility-administered medications for this encounter.       Allergies:  No Known Allergies    FamilyHX:      Family History   Problem Relation Age of Onset    Diabetes Father        SocialHX:  Social History     Socioeconomic History    Marital status: Single     Spouse name: None    Number of children: None    Years of education: None    Highest education level: None   Tobacco Use    Smoking status: Never     Passive exposure: Never    Smokeless tobacco: Never   Substance " "and Sexual Activity    Alcohol use: Never    Drug use: Never    Sexual activity: Not Currently     Partners: Male     Social Determinants of Health     Financial Resource Strain: Low Risk  (9/17/2024)    Financial Resource Strain     Within the past 12 months, have you or your family members you live with been unable to get utilities (heat, electricity) when it was really needed?: No   Food Insecurity: Low Risk  (9/17/2024)    Food Insecurity     Within the past 12 months, did you worry that your food would run out before you got money to buy more?: No     Within the past 12 months, did the food you bought just not last and you didn t have money to get more?: No   Transportation Needs: Low Risk  (9/17/2024)    Transportation Needs     Within the past 12 months, has lack of transportation kept you from medical appointments, getting your medicines, non-medical meetings or appointments, work, or from getting things that you need?: No   Interpersonal Safety: Low Risk  (9/17/2024)    Interpersonal Safety     Do you feel physically and emotionally safe where you currently live?: Yes     Within the past 12 months, have you been hit, slapped, kicked or otherwise physically hurt by someone?: No     Within the past 12 months, have you been humiliated or emotionally abused in other ways by your partner or ex-partner?: No   Housing Stability: Low Risk  (9/17/2024)    Housing Stability     Do you have housing? : Yes     Are you worried about losing your housing?: No       ROS: 10-point ROS negative except as in HPI.    Physical Exam:  Vitals:    09/17/24 2156 09/17/24 2201   BP: 110/65    Temp: 97.9  F (36.6  C)    TempSrc: Oral    Weight:  76.7 kg (169 lb)   Height:  1.689 m (5' 6.5\")     GEN: resting comfortably in bed, NAD   CV: Regular rate, well perfused  PULM: On room air, no increased work of breathing  ABD: soft, gravid, non-distended, no tenderness to palpation of RUQ     NST:  FHT: baseline ***, *** variability, *** " accels, *** decels  TOCO: *** ctx in ten minutes    A/P: 39 year old  at 32w6d by ***, here for right upper quadrant pain. Pregnancy notable for gestational diabetes and uterine fibroids. Differential includes musculoskeletal pain vs. Less likely labor.     #Musculoskeletal Pain   - Recommended abdominal binder  - Offered flexeril if needed   -Discussed symptoms for return and labor precautions       # Rule out Labor: Declined cervical exam       # Fetal Well-Being  - Continuous Monitoring ***  - FHT: Category ***    # PNC  - Rh ***, GBS ***  - GCT: ***  - Imaging: ***    Dispo: ***    Patient discussed with  ***    Darryl Ng, MS3  [facility]

## 2024-09-18 NOTE — PLAN OF CARE
Goal Outcome Evaluation:  Patient assessed in the Birthplace for  upper right quadrant pain since 2030 . Cervical exam deferred. Membranes intact. Contractions are present. Contractions are occass.. Uterine and soft by palpation. See flowsheets for fetal assessment documentation. Presumed adequate fetal oxygenation documented. Pt declines tylenol or hot packs for pain. States pain improved. Dr Forrest notified.  at bedside. NST done. at bedside.  Report given to Cherrie OJEDA.

## 2025-07-19 ENCOUNTER — HEALTH MAINTENANCE LETTER (OUTPATIENT)
Age: 40
End: 2025-07-19